# Patient Record
Sex: MALE | Race: WHITE
[De-identification: names, ages, dates, MRNs, and addresses within clinical notes are randomized per-mention and may not be internally consistent; named-entity substitution may affect disease eponyms.]

---

## 2019-03-07 NOTE — REP
Head CT without contrast:

 

History:  Severe headache.

 

Comparison study: No comparison study.

 

CT findings:  Bone window settings demonstrate an intact bony calvarium.  There

is no evidence of skull fracture or incidental bony calvarial lesion.  The

visualized paranasal sinuses appear clear.  No intraorbital abnormality is seen.

On soft tissue window setting images; the lateral, third, and fourth ventricles

are normal in size and position.  Gray-white differentiation pattern is normal

above and below the tentorium.  There are is no evidence of intracranial

hemorrhage.  No mass, edema, infarction, or midline shift is seen.  No

extra-axial fluid collection is appreciated.

 

Impression:

 

Negative noncontrast head CT.

 

 

Electronically Signed by

Pasquale Snyder MD 03/07/2019 06:10 P

## 2019-09-05 ENCOUNTER — HOSPITAL ENCOUNTER (EMERGENCY)
Dept: HOSPITAL 53 - M ED | Age: 19
Discharge: HOME | End: 2019-09-05
Payer: COMMERCIAL

## 2019-09-05 VITALS — BODY MASS INDEX: 26.92 KG/M2 | WEIGHT: 171.52 LBS | HEIGHT: 67 IN

## 2019-09-05 VITALS — SYSTOLIC BLOOD PRESSURE: 111 MMHG | DIASTOLIC BLOOD PRESSURE: 67 MMHG

## 2019-09-05 DIAGNOSIS — Z04.1: Primary | ICD-10-CM

## 2019-09-05 DIAGNOSIS — S09.90XA: ICD-10-CM

## 2019-09-05 DIAGNOSIS — S13.9XXA: ICD-10-CM
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
## 2019-09-13 ENCOUNTER — HOSPITAL ENCOUNTER (EMERGENCY)
Dept: HOSPITAL 53 - M ED | Age: 19
Discharge: HOME | End: 2019-09-13
Payer: COMMERCIAL

## 2019-09-13 VITALS — BODY MASS INDEX: 25.36 KG/M2 | HEIGHT: 68 IN | WEIGHT: 167.35 LBS

## 2019-09-13 VITALS — DIASTOLIC BLOOD PRESSURE: 68 MMHG | SYSTOLIC BLOOD PRESSURE: 136 MMHG

## 2019-09-13 DIAGNOSIS — S29.012A: ICD-10-CM
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
## 2019-09-13 DIAGNOSIS — S16.1XXA: ICD-10-CM

## 2019-09-13 DIAGNOSIS — S06.0X0A: ICD-10-CM

## 2019-09-13 DIAGNOSIS — Z04.1: Primary | ICD-10-CM
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
## 2020-03-20 ENCOUNTER — HOSPITAL ENCOUNTER (EMERGENCY)
Dept: HOSPITAL 53 - M ED | Age: 20
Discharge: HOME | End: 2020-03-20
Payer: COMMERCIAL

## 2020-03-20 VITALS — SYSTOLIC BLOOD PRESSURE: 152 MMHG | DIASTOLIC BLOOD PRESSURE: 65 MMHG

## 2020-03-20 VITALS — BODY MASS INDEX: 26.83 KG/M2 | HEIGHT: 68 IN | WEIGHT: 177.03 LBS
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
## 2020-03-20 DIAGNOSIS — S01.85XA: Primary | ICD-10-CM
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
## 2020-03-28 ENCOUNTER — HOSPITAL ENCOUNTER (EMERGENCY)
Dept: HOSPITAL 53 - M ED | Age: 20
Discharge: HOME | End: 2020-03-28
Payer: COMMERCIAL

## 2020-03-28 VITALS — SYSTOLIC BLOOD PRESSURE: 118 MMHG | DIASTOLIC BLOOD PRESSURE: 59 MMHG

## 2020-03-28 VITALS — WEIGHT: 174.83 LBS | BODY MASS INDEX: 26.5 KG/M2 | HEIGHT: 68 IN

## 2020-03-28 DIAGNOSIS — Z48.02: Primary | ICD-10-CM

## 2020-09-08 ENCOUNTER — HOSPITAL ENCOUNTER (EMERGENCY)
Dept: HOSPITAL 53 - M ED | Age: 20
Discharge: HOME | End: 2020-09-08
Payer: COMMERCIAL

## 2020-09-08 VITALS — WEIGHT: 164.69 LBS | BODY MASS INDEX: 24.96 KG/M2 | HEIGHT: 68 IN

## 2020-09-08 VITALS — SYSTOLIC BLOOD PRESSURE: 118 MMHG | DIASTOLIC BLOOD PRESSURE: 67 MMHG

## 2020-09-08 DIAGNOSIS — Z79.899: ICD-10-CM

## 2020-09-08 DIAGNOSIS — R10.30: Primary | ICD-10-CM

## 2020-09-08 NOTE — REPVR
PROCEDURE INFORMATION: 

Exam: XR Left Hip with Pelvis when Performed 

Exam date and time: 9/8/2020 9:33 PM 

Age: 20 years old 

Clinical indication: Hip pain; Left hip; Additional info: Hip pain over greater 

trochanter and psoas muscle 



TECHNIQUE: 

Imaging protocol: XR Left hip with pelvis when performed. 

Views: 2 or 3 views. 



COMPARISON: 

CT ABD/PEL W/IV CONTRAST ONLY 2018-10-08 17:19 



FINDINGS: 

Bones/joints: Unremarkable. No acute fracture. 

Soft tissues: Unremarkable. 



IMPRESSION: 

No acute findings. 



Electronically signed by: Robi Fraire On 09/08/2020  23:16:58 PM

## 2020-09-08 NOTE — REPVR
PROCEDURE INFORMATION: 

Exam: US Pelvis Limited, Male 

Exam date and time: 9/8/2020 10:53 PM 

Age: 20 years old 

Clinical indication: Hip pain; Left hip; Additional info: Pain hesselbachs 

triangle, L, R/O hernia 



TECHNIQUE: 

Imaging protocol: Real-time pelvic ultrasound with image documentation. 



COMPARISON: 

CT ABD/PEL W/IV CONTRAST ONLY 2018-10-08 17:19 



FINDINGS: 

Soft tissues: No identified hernia in the left groin. Bilateral unremarkable 

inguinal canals. 



IMPRESSION: 

No identified hernia in the left groin. Bilateral unremarkable inguinal canals. 



Electronically signed by: Robi Fraire On 09/08/2020  23:17:47 PM